# Patient Record
Sex: MALE | Race: OTHER | URBAN - METROPOLITAN AREA
[De-identification: names, ages, dates, MRNs, and addresses within clinical notes are randomized per-mention and may not be internally consistent; named-entity substitution may affect disease eponyms.]

---

## 2018-01-25 ENCOUNTER — EMERGENCY (EMERGENCY)
Facility: HOSPITAL | Age: 28
LOS: 1 days | Discharge: HOME | End: 2018-01-25
Attending: EMERGENCY MEDICINE | Admitting: INTERNAL MEDICINE
Payer: COMMERCIAL

## 2018-01-25 VITALS
TEMPERATURE: 99 F | DIASTOLIC BLOOD PRESSURE: 65 MMHG | HEIGHT: 67 IN | SYSTOLIC BLOOD PRESSURE: 125 MMHG | RESPIRATION RATE: 17 BRPM | WEIGHT: 190.04 LBS | HEART RATE: 51 BPM | OXYGEN SATURATION: 100 %

## 2018-01-25 DIAGNOSIS — Z96.7 PRESENCE OF OTHER BONE AND TENDON IMPLANTS: Chronic | ICD-10-CM

## 2018-01-25 PROCEDURE — 93010 ELECTROCARDIOGRAM REPORT: CPT | Mod: NC

## 2018-01-25 PROCEDURE — 99285 EMERGENCY DEPT VISIT HI MDM: CPT | Mod: 25

## 2018-01-26 ENCOUNTER — TRANSCRIPTION ENCOUNTER (OUTPATIENT)
Age: 28
End: 2018-01-26

## 2018-01-26 VITALS
DIASTOLIC BLOOD PRESSURE: 78 MMHG | RESPIRATION RATE: 16 BRPM | SYSTOLIC BLOOD PRESSURE: 128 MMHG | TEMPERATURE: 98 F | OXYGEN SATURATION: 100 % | HEART RATE: 50 BPM

## 2018-01-26 DIAGNOSIS — R00.2 PALPITATIONS: ICD-10-CM

## 2018-01-26 DIAGNOSIS — Z29.9 ENCOUNTER FOR PROPHYLACTIC MEASURES, UNSPECIFIED: ICD-10-CM

## 2018-01-26 DIAGNOSIS — R06.02 SHORTNESS OF BREATH: ICD-10-CM

## 2018-01-26 DIAGNOSIS — N17.9 ACUTE KIDNEY FAILURE, UNSPECIFIED: ICD-10-CM

## 2018-01-26 DIAGNOSIS — Z90.49 ACQUIRED ABSENCE OF OTHER SPECIFIED PARTS OF DIGESTIVE TRACT: Chronic | ICD-10-CM

## 2018-01-26 LAB
ALBUMIN SERPL ELPH-MCNC: 4.7 G/DL — SIGNIFICANT CHANGE UP (ref 3.3–5)
ALP SERPL-CCNC: 53 U/L — SIGNIFICANT CHANGE UP (ref 40–120)
ALT FLD-CCNC: 22 U/L — SIGNIFICANT CHANGE UP (ref 4–41)
APAP SERPL-MCNC: < 15 UG/ML — LOW (ref 15–25)
AST SERPL-CCNC: 21 U/L — SIGNIFICANT CHANGE UP (ref 4–40)
BARBITURATES MEASUREMENT: NEGATIVE — SIGNIFICANT CHANGE UP
BASOPHILS # BLD AUTO: 0.03 K/UL — SIGNIFICANT CHANGE UP (ref 0–0.2)
BASOPHILS NFR BLD AUTO: 0.5 % — SIGNIFICANT CHANGE UP (ref 0–2)
BENZODIAZ SERPL-MCNC: NEGATIVE — SIGNIFICANT CHANGE UP
BILIRUB SERPL-MCNC: 0.3 MG/DL — SIGNIFICANT CHANGE UP (ref 0.2–1.2)
BUN SERPL-MCNC: 20 MG/DL — SIGNIFICANT CHANGE UP (ref 7–23)
BUN SERPL-MCNC: 23 MG/DL — SIGNIFICANT CHANGE UP (ref 7–23)
CALCIUM SERPL-MCNC: 9.3 MG/DL — SIGNIFICANT CHANGE UP (ref 8.4–10.5)
CALCIUM SERPL-MCNC: 9.3 MG/DL — SIGNIFICANT CHANGE UP (ref 8.4–10.5)
CHLORIDE SERPL-SCNC: 101 MMOL/L — SIGNIFICANT CHANGE UP (ref 98–107)
CHLORIDE SERPL-SCNC: 101 MMOL/L — SIGNIFICANT CHANGE UP (ref 98–107)
CHOLEST SERPL-MCNC: 232 MG/DL — HIGH (ref 120–199)
CK MB BLD-MCNC: 1.91 NG/ML — SIGNIFICANT CHANGE UP (ref 1–6.6)
CK MB BLD-MCNC: 1.95 NG/ML — SIGNIFICANT CHANGE UP (ref 1–6.6)
CK MB BLD-MCNC: SIGNIFICANT CHANGE UP (ref 0–2.5)
CK SERPL-CCNC: 114 U/L — SIGNIFICANT CHANGE UP (ref 30–200)
CK SERPL-CCNC: 126 U/L — SIGNIFICANT CHANGE UP (ref 30–200)
CO2 SERPL-SCNC: 26 MMOL/L — SIGNIFICANT CHANGE UP (ref 22–31)
CO2 SERPL-SCNC: 30 MMOL/L — SIGNIFICANT CHANGE UP (ref 22–31)
CREAT SERPL-MCNC: 1.04 MG/DL — SIGNIFICANT CHANGE UP (ref 0.5–1.3)
CREAT SERPL-MCNC: 1.42 MG/DL — HIGH (ref 0.5–1.3)
EOSINOPHIL # BLD AUTO: 0.07 K/UL — SIGNIFICANT CHANGE UP (ref 0–0.5)
EOSINOPHIL NFR BLD AUTO: 1.1 % — SIGNIFICANT CHANGE UP (ref 0–6)
ETHANOL BLD-MCNC: < 10 MG/DL — SIGNIFICANT CHANGE UP
GLUCOSE SERPL-MCNC: 102 MG/DL — HIGH (ref 70–99)
GLUCOSE SERPL-MCNC: 91 MG/DL — SIGNIFICANT CHANGE UP (ref 70–99)
HBA1C BLD-MCNC: 5.4 % — SIGNIFICANT CHANGE UP (ref 4–5.6)
HCT VFR BLD CALC: 43.6 % — SIGNIFICANT CHANGE UP (ref 39–50)
HCT VFR BLD CALC: 44.1 % — SIGNIFICANT CHANGE UP (ref 39–50)
HDLC SERPL-MCNC: 68 MG/DL — HIGH (ref 35–55)
HGB BLD-MCNC: 14.8 G/DL — SIGNIFICANT CHANGE UP (ref 13–17)
HGB BLD-MCNC: 14.9 G/DL — SIGNIFICANT CHANGE UP (ref 13–17)
IMM GRANULOCYTES # BLD AUTO: 0.01 # — SIGNIFICANT CHANGE UP
IMM GRANULOCYTES NFR BLD AUTO: 0.2 % — SIGNIFICANT CHANGE UP (ref 0–1.5)
LIPID PNL WITH DIRECT LDL SERPL: 163 MG/DL — SIGNIFICANT CHANGE UP
LYMPHOCYTES # BLD AUTO: 2.53 K/UL — SIGNIFICANT CHANGE UP (ref 1–3.3)
LYMPHOCYTES # BLD AUTO: 41.1 % — SIGNIFICANT CHANGE UP (ref 13–44)
MAGNESIUM SERPL-MCNC: 2 MG/DL — SIGNIFICANT CHANGE UP (ref 1.6–2.6)
MCHC RBC-ENTMCNC: 30.8 PG — SIGNIFICANT CHANGE UP (ref 27–34)
MCHC RBC-ENTMCNC: 30.8 PG — SIGNIFICANT CHANGE UP (ref 27–34)
MCHC RBC-ENTMCNC: 33.8 % — SIGNIFICANT CHANGE UP (ref 32–36)
MCHC RBC-ENTMCNC: 33.9 % — SIGNIFICANT CHANGE UP (ref 32–36)
MCV RBC AUTO: 90.6 FL — SIGNIFICANT CHANGE UP (ref 80–100)
MCV RBC AUTO: 91.1 FL — SIGNIFICANT CHANGE UP (ref 80–100)
MONOCYTES # BLD AUTO: 0.56 K/UL — SIGNIFICANT CHANGE UP (ref 0–0.9)
MONOCYTES NFR BLD AUTO: 9.1 % — SIGNIFICANT CHANGE UP (ref 2–14)
NEUTROPHILS # BLD AUTO: 2.96 K/UL — SIGNIFICANT CHANGE UP (ref 1.8–7.4)
NEUTROPHILS NFR BLD AUTO: 48 % — SIGNIFICANT CHANGE UP (ref 43–77)
NRBC # FLD: 0 — SIGNIFICANT CHANGE UP
NRBC # FLD: 0 — SIGNIFICANT CHANGE UP
NT-PROBNP SERPL-SCNC: 22.82 PG/ML — SIGNIFICANT CHANGE UP
PLATELET # BLD AUTO: 187 K/UL — SIGNIFICANT CHANGE UP (ref 150–400)
PLATELET # BLD AUTO: 192 K/UL — SIGNIFICANT CHANGE UP (ref 150–400)
PMV BLD: 10.4 FL — SIGNIFICANT CHANGE UP (ref 7–13)
PMV BLD: 10.4 FL — SIGNIFICANT CHANGE UP (ref 7–13)
POTASSIUM SERPL-MCNC: 3.9 MMOL/L — SIGNIFICANT CHANGE UP (ref 3.5–5.3)
POTASSIUM SERPL-MCNC: 4.7 MMOL/L — SIGNIFICANT CHANGE UP (ref 3.5–5.3)
POTASSIUM SERPL-SCNC: 3.9 MMOL/L — SIGNIFICANT CHANGE UP (ref 3.5–5.3)
POTASSIUM SERPL-SCNC: 4.7 MMOL/L — SIGNIFICANT CHANGE UP (ref 3.5–5.3)
PROT SERPL-MCNC: 7.3 G/DL — SIGNIFICANT CHANGE UP (ref 6–8.3)
RBC # BLD: 4.81 M/UL — SIGNIFICANT CHANGE UP (ref 4.2–5.8)
RBC # BLD: 4.84 M/UL — SIGNIFICANT CHANGE UP (ref 4.2–5.8)
RBC # FLD: 12.6 % — SIGNIFICANT CHANGE UP (ref 10.3–14.5)
RBC # FLD: 12.8 % — SIGNIFICANT CHANGE UP (ref 10.3–14.5)
SALICYLATES SERPL-MCNC: < 5 MG/DL — LOW (ref 15–30)
SODIUM SERPL-SCNC: 140 MMOL/L — SIGNIFICANT CHANGE UP (ref 135–145)
SODIUM SERPL-SCNC: 140 MMOL/L — SIGNIFICANT CHANGE UP (ref 135–145)
TRIGL SERPL-MCNC: 92 MG/DL — SIGNIFICANT CHANGE UP (ref 10–149)
TROPONIN T SERPL-MCNC: < 0.06 NG/ML — SIGNIFICANT CHANGE UP (ref 0–0.06)
TROPONIN T SERPL-MCNC: < 0.06 NG/ML — SIGNIFICANT CHANGE UP (ref 0–0.06)
TSH SERPL-MCNC: 4.94 UIU/ML — HIGH (ref 0.27–4.2)
WBC # BLD: 4.72 K/UL — SIGNIFICANT CHANGE UP (ref 3.8–10.5)
WBC # BLD: 6.16 K/UL — SIGNIFICANT CHANGE UP (ref 3.8–10.5)
WBC # FLD AUTO: 4.72 K/UL — SIGNIFICANT CHANGE UP (ref 3.8–10.5)
WBC # FLD AUTO: 6.16 K/UL — SIGNIFICANT CHANGE UP (ref 3.8–10.5)

## 2018-01-26 RX ORDER — INFLUENZA VIRUS VACCINE 15; 15; 15; 15 UG/.5ML; UG/.5ML; UG/.5ML; UG/.5ML
0.5 SUSPENSION INTRAMUSCULAR ONCE
Qty: 0 | Refills: 0 | Status: DISCONTINUED | OUTPATIENT
Start: 2018-01-26 | End: 2018-01-26

## 2018-01-26 RX ORDER — ASPIRIN/CALCIUM CARB/MAGNESIUM 324 MG
162 TABLET ORAL ONCE
Qty: 0 | Refills: 0 | Status: COMPLETED | OUTPATIENT
Start: 2018-01-26 | End: 2018-01-26

## 2018-01-26 RX ORDER — ASPIRIN/CALCIUM CARB/MAGNESIUM 324 MG
81 TABLET ORAL DAILY
Qty: 0 | Refills: 0 | Status: DISCONTINUED | OUTPATIENT
Start: 2018-01-27 | End: 2018-01-26

## 2018-01-26 RX ORDER — ACETAMINOPHEN 500 MG
650 TABLET ORAL EVERY 6 HOURS
Qty: 0 | Refills: 0 | Status: DISCONTINUED | OUTPATIENT
Start: 2018-01-26 | End: 2018-01-26

## 2018-01-26 RX ORDER — ASPIRIN/CALCIUM CARB/MAGNESIUM 324 MG
1 TABLET ORAL
Qty: 0 | Refills: 0 | COMMUNITY
Start: 2018-01-26

## 2018-01-26 RX ADMIN — Medication 162 MILLIGRAM(S): at 02:52

## 2018-01-26 NOTE — ED PROVIDER NOTE - MUSCULOSKELETAL, MLM
Spine appears normal, range of motion is not limited, no muscle or joint tenderness. CN intact, no pronator drift, cerebellar function intact

## 2018-01-26 NOTE — CONSULT NOTE ADULT - SUBJECTIVE AND OBJECTIVE BOX
Dr. Hollins (Nephrology)  Office [phone]  Cell [phone]  Yue MARTINEZ  Cell [phone]    HPI:  26 y/o male, with a PmHx of Left leg ORIF & Appendectomy, presented to the [facility] ED c/o palpitations. Pt states last night at around 2130hrs, while working (), he started to develop palpitations, b/l hand numbness, Left sided facial numbness and dizziness. He stated he felt like he was having an anxiety attack. He pulled off to the side of the road for about 15 minutes to wait for the symptoms to resolve. Pt states he has had episodes like this in the past but didn't think anything of it. Ironically, pt states about 1.5 weeks ago, he had gone to his PCP's office to get a physical because he needed clearance so he could get a [employer] license but in her office he was found to have an abnormal EKG so she wouldn't sign him off until he had seen a Cardiologist. Last Tuesday, he had gone to the Cardiologist for the first time and had a stress test done and was told it was abnormal and was then scheduled for a follow up eval next Thursday to discuss the next step but then these symptoms happened so he came to [facility] for an evaluation. He denies any fever, chills, chest pain, HA, blurred vision, n/v, sob, abd pain, sick contacts, recent travel. Pt states he feels better now but he got scared when he had this last episode. He appears comfortable at this time. He is being admitted to telemetry for further medical evaluation.      Allergies:  No Known Allergies      PAST MEDICAL & SURGICAL HISTORY:  History of appendectomy  S/P ORIF (open reduction internal fixation) fracture: left leg      Home Medications Reviewed    Hospital Medications:   MEDICATIONS  (STANDING):      SOCIAL HISTORY:  Denies ETOh, Smoking,     FAMILY HISTORY:  Family history of diabetes mellitus in father (Father)      REVIEW OF SYSTEMS:  CONSTITUTIONAL: No weakness, fevers or chills  EYES/ENT: No visual changes;  No vertigo or throat pain   NECK: No pain or stiffness  RESPIRATORY: No cough, wheezing, hemoptysis; + shortness of breath  CARDIOVASCULAR:+ palpitations.  GASTROINTESTINAL: No abdominal or epigastric pain. No nausea, vomiting, or hematemesis; No diarrhea or constipation. No melena or hematochezia.  GENITOURINARY: No dysuria, frequency, foamy urine, urinary urgency, incontinence or hematuria  NEUROLOGICAL: see HPI  SKIN: No itching, burning, rashes, or lesions   VASCULAR: No bilateral lower extremity edema.   All other review of systems is negative unless indicated above.    VITALS:  T(F): 98.6 (01-25-18 @ 23:18), Max: 98.6 (01-25-18 @ 23:18)  HR: 49 (01-26-18 @ 09:30)  BP: 124/78 (01-26-18 @ 09:30)  RR: 18 (01-26-18 @ 09:30)  SpO2: 100% (01-26-18 @ 09:30)  Wt(kg): --    Height (cm): 170.18 (01-26 @ 08:09)  Weight (kg): 86.2 (01-26 @ 08:09)  BMI (kg/m2): 29.8 (01-26 @ 08:09)  BSA (m2): 1.98 (01-26 @ 08:09)    PHYSICAL EXAM:  Constitutional: NAD  HEENT: anicteric sclera, oropharynx clear, MMM  Neck: No JVD  Respiratory: CTAB, no wheezes, rales or rhonchi  Cardiovascular: S1, S2, RRR  Gastrointestinal: BS+, soft, NT/ND  Extremities: No cyanosis or clubbing. No peripheral edema  Neurological: A/O x 3, no focal deficits  Psychiatric: Normal mood, normal affect  : No CVA tenderness. No ingram.   Skin: No rashes    LABS:  01-26    140  |  101  |  20  ----------------------------<  91  4.7   |  30  |  1.04    Ca    9.3      26 Jan 2018 08:00  Mg     2.0     01-26    TPro  7.3  /  Alb  4.7  /  TBili  0.3  /  DBili      /  AST  21  /  ALT  22  /  AlkPhos  53  01-26    Creatinine Trend: 1.04 <--, 1.42 <--                        14.9   4.72  )-----------( 187      ( 26 Jan 2018 04:55 )             44.1     Urine Studies:        RADIOLOGY & ADDITIONAL STUDIES:

## 2018-01-26 NOTE — DISCHARGE NOTE ADULT - PATIENT PORTAL LINK FT
“You can access the Follow[facility] Patient Portal, offered by [facility], by registering with the following website: [email or web address]”

## 2018-01-26 NOTE — ED PROVIDER NOTE - OBJECTIVE STATEMENT
28yo M no PMH presents with palpitations, L hand tingling, dizziness, and L facial numbness. Symptoms started at around 9.30pm when he was driving. He pulled over to the side of the road. Symptoms lasted for about 15 mins. Pt felt like he was 'going to die'. Pt denies CP, SOB, N/V, diarrhea/constipation, fever, chills, abd pain, weakness. + fatigue. Had similar symptoms when he was 20 years old which went away on its own.  Pt reports seen by cardiologist who said he had a scar on his heart w/ follow up appointment set for Thursday 2/1. Eats marijuana once a month, last used 1.5 month ago. All symptoms have resolved. 26yo M no PMH presents with palpitations, L hand tingling, dizziness, and L facial numbness. Symptoms started at around 9.30pm when he was driving. He pulled over to the side of the road. Symptoms lasted for about 15 mins. Pt felt like he was 'going to die'. Pt denies CP, SOB, N/V, diarrhea/constipation, fever, chills, abd pain, weakness. + fatigue. Had similar symptoms when he was 20 years old which went away on its own.  Pt reports being seen by a cardiologist and had a nuclear stress test which found a scar on his heart. He has a follow up appointment set for Thursday 2/1. Eats marijuana once a month, last used 1.5 month ago. All symptoms have resolved. 26yo M no PMH presents with SOB, palpitations, L hand tingling, dizziness, and L facial numbness. Symptoms started at around 9.30pm when he was driving. He pulled over to the side of the road. Symptoms lasted for about 15 mins. Pt felt like he was 'going to die'. Pt denies CP, SOB, N/V, diarrhea/constipation, fever, chills, abd pain, weakness. + fatigue. Had similar symptoms when he was 20 years old which went away on its own.  Pt reports being seen by a cardiologist and had a nuclear stress test which found a scar on his heart. He has a follow up appointment set for Thursday 2/1. Eats marijuana once a month, last used 1.5 month ago. All symptoms have resolved.

## 2018-01-26 NOTE — H&P ADULT - ATTENDING COMMENTS
EKG - Sinus ruth t wave inversions v3-v6 and inferior leads ( same as old)     a/p     1) Abnormal EKG - old EKG looks same, excellent exercise tolerance, plays basketball, insanity workouts, no chest pain no SOB, apparently had a stress test in out pt cardiologist office which was abnormal , will get report if significantly abnormal will get cath done. get 2d echo , cont asa    2) Presthesias - had paresthesias in both hands and right side of face which resolved     3) Palpitations - get 2d echo

## 2018-01-26 NOTE — DISCHARGE NOTE ADULT - CARE PLAN
Principal Discharge DX:	Shortness of breath  Goal:	Followup with PMD and take all medications prescribed.  Assessment and plan of treatment:	Followup with Cardiology Dr Greenfield Phone: [phone]  Secondary Diagnosis:	Palpitations  Goal:	Followup with PMD and take all medications prescribed.  Assessment and plan of treatment:	Followup with Cardiology Dr Greenfield Phone: [phone]

## 2018-01-26 NOTE — ED PROVIDER NOTE - ATTENDING CONTRIBUTION TO CARE
28 y/o healthy M here after an episode of SOB.  Pt reports he was driving around 9-10pm, suddenly started to feel short of breath.  He tried to calm his breath, but then started to have palpitations (sensation of heart racing) and tingling to his bilateral fingers and face.  Entire episode lasting approx 15-20 min, resolved on own.  Denies associated fever, chills, cp, back pain, abd pain, leg pain or swelling.  No recent travel or known sick contacts.  Pt is currently under cardiac evaluation - he had an abnormal EKG on routine physical, had a positive nuclear stress test last Tuesday and was scheduled for cardiology evaluation next Thursday ([facility] [place]).  Well appearing, lying comfortably in stretcher, awake and alert, nontoxic.  VSS.  Lungs cta bl.  Cards nl S1/S2, RRR, no MRG.  Abd soft ntnd.  No pedal edema or calf tenderness.  EKG with TWI in V2-V5 with Wellens morphology, similar to old EKG.  Plan for labs, cxr, admit tele for acs work-up in setting of SOB (?acs equivalent) with abnormal EKG and outpatient stress test.

## 2018-01-26 NOTE — ED PROVIDER NOTE - MEDICAL DECISION MAKING DETAILS
26yo M no PMH p/w palpitations, L facial numbness, dizziness, L hand tingling, now resolved  -r/o metabolic, cardiac cause. check EKG, basic labs, re-assess. 28yo M no PMH p/w palpitations, L facial numbness, dizziness, L hand tingling, now resolved  -r/o metabolic, cardiac cause, neurology outpatient follow up, check EKG, basic labs, re-assess.

## 2018-01-26 NOTE — H&P ADULT - NSHPSOCIALHISTORY_GEN_ALL_CORE
Marital Status: Single    Occupation: Doorman/    Tobacco Use: smokes 3-4 cig/week x 14 years    ETOH Use: on weekends only    Flu Vaccine:    neg                              Pneumonia Vaccine: neg

## 2018-01-26 NOTE — DISCHARGE NOTE ADULT - MEDICATION SUMMARY - MEDICATIONS TO TAKE
I will START or STAY ON the medications listed below when I get home from the hospital:    aspirin 81 mg oral delayed release tablet  -- 1 tab(s) by mouth once a day  -- Indication: For Heart health

## 2018-01-26 NOTE — DISCHARGE NOTE ADULT - CARE PROVIDER_API CALL
Facundo Greenfield), Cardiovascular Disease; Internal Medicine; Nuclear Cardiology  [address] [place] [address]  Phone: [phone]  Fax: [phone]

## 2018-01-26 NOTE — H&P ADULT - PROBLEM SELECTOR PLAN 1
ekg/telemetry, f/u ce x 2, mg, tsh, echo, pt is bradycardic, EP c/s, obtain recent stress test results, poss cardiac cath

## 2018-01-26 NOTE — H&P ADULT - NSHPPHYSICALEXAM_GEN_ALL_CORE
Vital Signs Last 24 Hrs  T(C): 37 (25 Jan 2018 23:18), Max: 37 (25 Jan 2018 23:18)  T(F): 98.6 (25 Jan 2018 23:18), Max: 98.6 (25 Jan 2018 23:18)  HR: 45 (26 Jan 2018 06:22) (45 - 51)  BP: 129/79 (26 Jan 2018 06:22) (124/67 - 140/58)  BP(mean): --  RR: 16 (26 Jan 2018 06:22) (12 - 17)  SpO2: 100% (26 Jan 2018 06:22) (100% - 100%)    EKG: Sinus ruth @ 49, incomplete RBBB, T inv I, II, III, AVL, AVF, V3-6, ST inc AVR, V1

## 2018-01-26 NOTE — SBIRT NOTE. - NSSBIRTSERVICES_GEN_A_ED_FT
Provided SBIRT services: Full screen positive. Brief Intervention Performed. Screening results were reviewed with the patient and patient was provided information about healthy guidelines and potential negative consequences associated with level of risk. Motivation and readiness to reduce or stop use was discussed and goals and activities to make changes were suggested/offered.  Audit Score: 9  DAST Score: 2  Duration = #  15 Minutes

## 2018-01-26 NOTE — ED PROVIDER NOTE - CHIEF COMPLAINT
The patient is a 27y Male complaining of dizziness, L facial numbness, L hand tingling The patient is a 27y Male complaining of SOB, dizziness, L facial numbness, L hand tingling

## 2018-01-26 NOTE — H&P ADULT - ASSESSMENT
28 y/o male, with a PmHx of Left leg ORIF & Appendectomy, presented to the [facility] ED c/o palpitations. Pt is being admitted to telemetry for r/o acs.

## 2018-01-26 NOTE — ED PROVIDER NOTE - PROGRESS NOTE DETAILS
EKG shows TWI I, II, III, AVF, V3-V6, unchanged from prior EKG. will check 2 set of cardiac enzymes. Will admit tele doc given abnormal EKG.

## 2018-01-26 NOTE — H&P ADULT - HISTORY OF PRESENT ILLNESS
26 y/o male, with a PmHx of Left leg ORIF & Appendectomy, presented to the [facility] ED c/o palpitations. Pt states last night at around 2130hrs, while working (), he started to develop palpitations, b/l hand numbness, Left sided facial numbness and dizziness. He stated he felt like he was having an anxiety attack. He pulled off to the side of the road for about 15 minutes to wait for the symptoms to resolve. Pt states he has had episodes like this in the past but didn't think anything of it. Ironically, pt states about 1.5 weeks ago, he had gone to his PCP's office to get a physical because he needed clearance so he could get a [employer] license but in her office he was found to have an abnormal EKG so she wouldn't sign him off until he had seen a Cardiologist. Last Tuesday, he had gone to the Cardiologist for the first time and had a stress test done and was told it was abnormal and was then scheduled for a follow up eval next Thursday to discuss the next step but then these symptoms happened so he came to [facility] for an evaluation. He denies any fever, chills, chest pain, HA, blurred vision, n/v, sob, abd pain, sick contacts, recent travel. Pt states he feels better now but he got scared when he had this last episode. He appears comfortable at this time. He is being admitted to telemetry for further medical evaluation.

## 2018-01-26 NOTE — DISCHARGE NOTE ADULT - HOSPITAL COURSE
26 y/o male, with a PmHx of Left leg ORIF & Appendectomy, presented to the [facility] ED c/o palpitations. Pt states last night at around 2130hrs, while working (), he started to develop palpitations, b/l hand numbness, Left sided facial numbness and dizziness. He stated he felt like he was having an anxiety attack. He pulled off to the side of the road for about 15 minutes to wait for the symptoms to resolve. Pt states he has had episodes like this in the past but didn't think anything of it. Ironically, pt states about 1.5 weeks ago, he had gone to his PCP's office to get a physical because he needed clearance so he could get a [employer] license but in her office he was found to have an abnormal EKG so she wouldn't sign him off until he had seen a Cardiologist. Last Tuesday, he had gone to the Cardiologist for the first time and had a stress test done and was told it was abnormal and was then scheduled for a follow up eval next Thursday to discuss the next step but then these symptoms happened so he came to [facility] for an evaluation. He denies any fever, chills, chest pain, HA, blurred vision, n/v, sob, abd pain, sick contacts, recent travel. Pt states he feels better now but he got scared when he had this last episode. He appears comfortable at this time. He is being admitted to telemetry for further medical evaluation.   Patient does not want to stay for workup in hospital. Wants to leave AMA, despite speaking to Dr Greenfield. Despite Risk of stroke, death, patient still wants to leave  AMA.

## 2018-01-26 NOTE — DISCHARGE NOTE ADULT - ADDITIONAL INSTRUCTIONS
Follow up with Cardiologist and PMD within one week of discharge. Call for appointment. Return to ED for any concerning symptoms. Continue medications as prescribed. Low salt, low fat, low cholesterol diet. Cardiology Dr Greenfield Phone: [phone]

## 2018-01-26 NOTE — DISCHARGE NOTE ADULT - PLAN OF CARE
Followup with PMD and take all medications prescribed. Followup with Cardiology Dr Greenfield Phone: [phone] Followup with Cardiology Dr Greenfield Phone: [phone]

## 2018-01-26 NOTE — ED ADULT NURSE NOTE - CHIEF COMPLAINT QUOTE
pt states earlier today while driving he experienced some palpations tingling down b/l arms and ears. states all symptoms have resolved and now he feels weak. vss, NAD pt ambulatory to triage with steady gait.

## 2018-01-26 NOTE — ED ADULT NURSE NOTE - OBJECTIVE STATEMENT
Pt received to rm 16 [id number] ambulatory c/o heart palpitation x 1o mins earlier today while driving.  Pt reports Episode lasting 10-15 mins included Left sided pectoral twitch, SOBdizziness, some blurry vison left arm numbness and tingling Pt states Everything just stopped.  I felt like I was gonna die.'  Pt reports seen by cardiologist who said he had a scar on his heart w/ follow up appointment set for thursday 2/1.  Pt denies NVD jaw pain, sharp radiating chest pain, epigastric pain, syncope fevers chills.  Pt p/w NAD breaths even unlabored skin warm dry intact 20 g IV access obtained at R. forearm labs as ordered.  Report given to Primary RN Radha.

## 2018-01-26 NOTE — H&P ADULT - RS GEN PE MLT RESP DETAILS PC
good air movement/no chest wall tenderness/clear to auscultation bilaterally/normal/breath sounds equal/airway patent/respirations non-labored

## 2018-01-26 NOTE — CONSULT NOTE ADULT - SUBJECTIVE AND OBJECTIVE BOX
CHIEF COMPLAINT:  Asked by Dr. Charles to evaluate patient with palpitations.   HISTORY OF PRESENT ILLNESS:  Mr. Wells is a pleasant 28 y/o male admitted with palpitations.   He reports b/l hand numbness, Left sided facial numbness and dizziness. ith a PmHx of Left leg ORIF & Appendectomy, presented to the [facility] ED c/o palpitations. Pt states last night at around 2130hrs, while working (), he started to develop He stated he felt like he was having an anxiety attack. He pulled off to the side of the road for about 15 minutes to wait for the symptoms to resolve. Pt states he has had episodes like this in the past but didn't think anything of it. Ironically, pt states about 1.5 weeks ago, he had gone to his PCP's office to get a physical because he needed clearance so he could get a [employer] license but in her office he was found to have an abnormal EKG so she wouldn't sign him off until he had seen a Cardiologist. Last Tuesday, he had gone to the Cardiologist for the first time and had a stress test done and was told it was abnormal and was then scheduled for a follow up eval next Thursday to discuss the   PAST MEDICAL & SURGICAL HISTORY:  History of appendectomy  S/P ORIF (open reduction internal fixation) fracture: left leg      [ ] Diabetes   [ ] Hypertension  [ ] Hyperlipidemia  [ ] CAD  [ ] PCI  [ ] CABG    PREVIOUS DIAGNOSTIC TESTING:    [ ] Echocardiogram:  [ ]  Catheterization:  [ ] Stress Test:  	    MEDICATIONS:        acetaminophen   Tablet. 650 milliGRAM(s) Oral every 6 hours PRN        influenza   Vaccine 0.5 milliLiter(s) IntraMuscular once      FAMILY HISTORY:  Family history of diabetes mellitus in father (Father)      SOCIAL HISTORY:    [ ] Non-smoker  [ ] Smoker  [ ] Alcohol    Allergies    No Known Allergies    Intolerances    	    REVIEW OF SYSTEMS:  CONSTITUTIONAL: No fever, weight loss, or fatigue  EYES: No eye pain, visual disturbances, or discharge  ENMT:  No difficulty hearing, tinnitus, vertigo; No sinus or throat pain  NECK: No pain or stiffness  RESPIRATORY: No cough, wheezing, chills or hemoptysis; No Shortness of Breath  CARDIOVASCULAR: No chest pain, palpitations, passing out, dizziness, or leg swelling  GASTROINTESTINAL: No abdominal or epigastric pain. No nausea, vomiting, or hematemesis; No diarrhea or constipation. No melena or hematochezia.  GENITOURINARY: No dysuria, frequency, hematuria, or incontinence  NEUROLOGICAL: No headaches, memory loss, loss of strength, numbness, or tremors  SKIN: No itching, burning, rashes, or lesions   LYMPH Nodes: No enlarged glands  ENDOCRINE: No heat or cold intolerance; No hair loss  MUSCULOSKELETAL: No joint pain or swelling; No muscle, back, or extremity pain  PSYCHIATRIC: No depression, anxiety, mood swings, or difficulty sleeping  HEME/LYMPH: No easy bruising, or bleeding gums  ALLERY AND IMMUNOLOGIC: No hives or eczema	    [ ] All others negative	  [ ] Unable to obtain    PHYSICAL EXAM:  T(C): 36.9 (01-26-18 @ 15:10), Max: 37 (01-25-18 @ 23:18)  HR: 50 (01-26-18 @ 15:10) (45 - 51)  BP: 128/78 (01-26-18 @ 15:10) (124/67 - 140/58)  RR: 16 (01-26-18 @ 15:10) (12 - 18)  SpO2: 100% (01-26-18 @ 15:10) (100% - 100%)  Wt(kg): --  I&O's Summary      Appearance: Normal	  HEENT:   Normal oral mucosa, PERRL, EOMI	  Lymphatic: No lymphadenopathy  Cardiovascular: Normal S1 S2, No JVD, No murmurs, No edema  Respiratory: Lungs clear to auscultation	  Psychiatry: A & O x 3, Mood & affect appropriate  Gastrointestinal:  Soft, Non-tender, + BS	  Skin: No rashes, No ecchymoses, No cyanosis	  Neurologic: Non-focal  Extremities: Normal range of motion, No clubbing, cyanosis or edema  Vascular: Peripheral pulses palpable 2+ bilaterally    TELEMETRY: 	    ECG:  	  RADIOLOGY:  OTHER: 	  	  LABS:	 	    CARDIAC MARKERS:      CKMB: 1.95 ng/mL (01-26 @ 08:00)  CKMB: 1.91 ng/mL (01-26 @ 00:06)    CKMB Relative Index: Test not performed (01-26 @ 08:00)                            14.9   4.72  )-----------( 187      ( 26 Jan 2018 04:55 )             44.1     01-26    140  |  101  |  20  ----------------------------<  91  4.7   |  30  |  1.04    Ca    9.3      26 Jan 2018 08:00  Mg     2.0     01-26    TPro  7.3  /  Alb  4.7  /  TBili  0.3  /  DBili  x   /  AST  21  /  ALT  22  /  AlkPhos  53  01-26    proBNP: Serum Pro-Brain Natriuretic Peptide: 22.82 pg/mL (01-26 @ 08:00)    Lipid Profile:   HgA1c: Hemoglobin A1C, Whole Blood: 5.4 % (01-26 @ 07:50)    TSH: Thyroid Stimulating Hormone, Serum: 4.94 uIU/mL (01-26 @ 08:00)      ASSESSMENT/PLAN: CHIEF COMPLAINT:  Asked by Dr. Charles to evaluate patient with palpitations.   HISTORY OF PRESENT ILLNESS:  Mr. Wells is a pleasant 26 y/o male admitted with palpitations associated with chest discomfort, facial numbness,  tingling in both his arms and dizziness.   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      Stress test performed by his PMD as part of a work up for [employer] license clearance showed abnormal finding.     He reports a similar incident at the age of 19 while playing in the park- felt dizzy, lightheaded associated with some palpitations. He brushed the event at the time assuming it to be secondary to dehydration and fatigue. Denies any FH of SCD.   EKG performed in the ED showed Sinus bradycardia with HR 49 bpm, NE 158ms, QRS 96 ms and QTc 484 ms.   Called to evaluate.   PAST MEDICAL & SURGICAL HISTORY:  History of appendectomy  S/P ORIF (open reduction internal fixation) fracture: left leg  PREVIOUS DIAGNOSTIC TESTING:    [ ] Echocardiogram:  [ ]  Catheterization:  [x ] Stress Test:  	    MEDICATIONS:  acetaminophen   Tablet. 650 milliGRAM(s) Oral every 6 hours PRN  influenza   Vaccine 0.5 milliLiter(s) IntraMuscular once  FAMILY HISTORY:  Family history of diabetes mellitus in father (Father)  SOCIAL HISTORY:    [ ] Non-smoker  [ x] Smoker  [ x] Alcohol    Allergies:   No Known Allergies    Intolerances:     REVIEW OF SYSTEMS:  CONSTITUTIONAL: No fever, weight loss, or fatigue  EYES: No eye pain, visual disturbances, or discharge  ENMT:  No difficulty hearing, tinnitus, vertigo; No sinus or throat pain  NECK: No pain or stiffness  RESPIRATORY: No cough, wheezing, chills or hemoptysis; No Shortness of Breath  CARDIOVASCULAR:+ chest pain, +palpitations, passing out,+ dizziness, or leg swelling  GASTROINTESTINAL: No abdominal or epigastric pain. No nausea, vomiting, or hematemesis; No diarrhea or constipation. No melena or hematochezia.  GENITOURINARY: No dysuria, frequency, hematuria, or incontinence  NEUROLOGICAL: No headaches, memory loss, loss of strength, numbness, or tremors  SKIN: No itching, burning, rashes, or lesions   LYMPH Nodes: No enlarged glands  ENDOCRINE: No heat or cold intolerance; No hair loss  MUSCULOSKELETAL: No joint pain or swelling; No muscle, back, or extremity pain  PSYCHIATRIC: No depression, anxiety, mood swings, or difficulty sleeping  HEME/LYMPH: No easy bruising, or bleeding gums  ALLERY AND IMMUNOLOGIC: No hives or eczema	    [ ] All others negative	  [ ] Unable to obtain    PHYSICAL EXAM:  T(C): 36.9 (01-26-18 @ 15:10), Max: 37 (01-25-18 @ 23:18)  HR: 50 (01-26-18 @ 15:10) (45 - 51)  BP: 128/78 (01-26-18 @ 15:10) (124/67 - 140/58)  RR: 16 (01-26-18 @ 15:10) (12 - 18)  SpO2: 100% (01-26-18 @ 15:10) (100% - 100%)  Wt(kg): --  I&O's Summary      Appearance: Normal	  HEENT:   Normal oral mucosa, PERRL, EOMI	  Lymphatic: No lymphadenopathy  Cardiovascular: Normal S1 S2, No JVD, No murmurs, No edema  Respiratory: Lungs clear to auscultation	  Psychiatry: A & O x 3, Mood & affect appropriate  Gastrointestinal:  Soft, Non-tender, + BS	  Skin: No rashes, No ecchymoses, No cyanosis	  Neurologic: Non-focal  Extremities: Normal range of motion, No clubbing, cyanosis or edema  Vascular: Peripheral pulses palpable 2+ bilaterally    TELEMETRY: 	    ECG:  	SB 49, NE 158ms, QRS 98ms, QTc 484 ms. T wave inversion in anterior leads V1 -V6.   RADIOLOGY:  OTHER: 	  LABS:	 	    CARDIAC MARKERS:  CKMB: 1.95 ng/mL (01-26 @ 08:00)  CKMB: 1.91 ng/mL (01-26 @ 00:06)  CKMB Relative Index: Test not performed (01-26 @ 08:00)                         14.9   4.72  )-----------( 187      ( 26 Jan 2018 04:55 )             44.1     01-26    140  |  101  |  20  ----------------------------<  91  4.7   |  30  |  1.04    Ca    9.3      26 Jan 2018 08:00  Mg     2.0     01-26    TPro  7.3  /  Alb  4.7  /  TBili  0.3  /  DBili  x   /  AST  21  /  ALT  22  /  AlkPhos  53  01-26  proBNP: Serum Pro-Brain Natriuretic Peptide: 22.82 pg/mL (01-26 @ 08:00)  Lipid Profile:   HgA1c: Hemoglobin A1C, Whole Blood: 5.4 % (01-26 @ 07:50)  TSH: Thyroid Stimulating Hormone, Serum: 4.94 uIU/mL (01-26 @ 08:00)      ASSESSMENT/PLAN: 	    In summary, Mr. Wells is a 29 yr old gentleman with no significant cardiac history, PMH of [facility] presents with complaints of palpitations associated with chest discomfort, dizziness, facial numbness and tingling in the arms. EKG showed T wave inversion in the anterior leads. Stress test showed scar in the anterior wall and perfusion abnormality.   Given his history of palpitations, EKG changes recommend TTE to evaluate LVEF and a cardiac MRI to rule out genetic etiology Arrhythmogenic right ventricular cardiomyopathy.   In the interim, monitor on telemetry.   Maintain K > 4.0 and Mg > 1.8    Thank you.     MINI Hill

## 2018-01-26 NOTE — CONSULT NOTE ADULT - ATTENDING COMMENTS
27 year old man with T wave changes on ecg. Need to rule out structural disease such as HCM, ARVC. Ischemic eval if needed. Would start with TTE then CMR.

## 2022-10-19 NOTE — SBIRT NOTE. - NSSBIRTSERVICES_GEN_A_ED
Brief Intervention Performed Start Stiolto. This will be 2 puffs daily. Continue your albuterol inhaler and start albuterol nebulizer. You may use one or the other every 6 hours as needed for shortness of breath or wheezing. Do NOT use both at the same time as they continue the same medication. I am also starting you on a saline nebulizer to use up to 3 times daily for chest congestion and thick sputum. This is NOT to be used as a rescue medication. We will start Mucinex 1 (600 mg tablet) twice daily. If you pick this up over the counter, make sure to get plain Mucinex, not Mucinex DM. I will see you back in 3 months. What is lung cancer screening? Lung cancer screening is a way in which doctors check the lungs for early signs of cancer in people who have no symptoms of lung cancer. A low-dose CT scan uses much less radiation than a normal CT scan and shows a more detailed image of the lungs than a standard X-ray. The goal of lung cancer screening is to find cancer early, before it has a chance to grow, spread, or cause problems. One large study found that smokers who were screened with low-dose CT scans were less likely to die of lung cancer than those who were screened with standard X-ray. Below is a summary of the things you need to know regarding screening for lung cancer with low-dose computed tomography (LDCT). This is a screening program that involves routine annual screening with LDCT studies of the lung. The LDCTs are done using low-dose radiation that is not thought to increase your cancer risk. If you have other serious medical conditions (other cancers, congestive heart failure) that limit your life expectancy to less than 10 years, you should not undergo lung cancer screening with LDCT. The chance is 20%-60% that the LDCT result will show abnormalities. This would require additional testing which could include repeat imaging or even invasive procedures.   Most (about 95%) of \"abnormal\" LDCT results are false in the sense that no lung cancer is ultimately found. Additionally, some (about 10%) of the cancers found would not affect your life expectancy, even if undetected and untreated. If you are still smoking, the single most important thing that you can do to reduce your risk of dying of lung cancer is to quit. For this screening to be covered by Medicare and most other insurers, strict criteria must be met. If you do not meet these criteria, but still wish to undergo LDCT testing, you will be required to sign a waiver indicating your willingness to pay for the scan.

## 2023-08-15 NOTE — H&P ADULT - FAMILY HISTORY
Mood disorder with psychosis   [id number]   Father  Still living? Yes, Estimated age: Age Unknown  Family history of diabetes mellitus in father, Age at diagnosis: Age Unknown
